# Patient Record
Sex: MALE | URBAN - METROPOLITAN AREA
[De-identification: names, ages, dates, MRNs, and addresses within clinical notes are randomized per-mention and may not be internally consistent; named-entity substitution may affect disease eponyms.]

---

## 2019-04-29 ENCOUNTER — OFFICE VISIT CONVERTED (OUTPATIENT)
Dept: NEUROSURGERY | Facility: CLINIC | Age: 39
End: 2019-04-29
Attending: PHYSICIAN ASSISTANT

## 2019-04-29 ENCOUNTER — CONVERSION ENCOUNTER (OUTPATIENT)
Dept: NEUROLOGY | Facility: CLINIC | Age: 39
End: 2019-04-29

## 2019-12-12 ENCOUNTER — OFFICE VISIT CONVERTED (OUTPATIENT)
Dept: CARDIOLOGY | Facility: CLINIC | Age: 39
End: 2019-12-12
Attending: INTERNAL MEDICINE

## 2019-12-26 ENCOUNTER — CONVERSION ENCOUNTER (OUTPATIENT)
Dept: CARDIOLOGY | Facility: CLINIC | Age: 39
End: 2019-12-26
Attending: INTERNAL MEDICINE

## 2021-04-22 ENCOUNTER — CONVERSION ENCOUNTER (OUTPATIENT)
Dept: CARDIOLOGY | Facility: CLINIC | Age: 41
End: 2021-04-22
Attending: INTERNAL MEDICINE

## 2021-05-11 NOTE — PROCEDURES
"   Procedure Note      Patient Name: Morris Barrios   Patient ID: [id number]   Sex: Male   YOB: 1980    Primary Care Provider: Carmenza CAMERON   Referring Provider: Carmenza CAMERON    Visit Date: April 22, 2021    Provider: Chi Worrell MD   Location: [facility]   Location Address: [address] [place] [address], [address]   [place]  [id number]   Location Phone: [phone]          FINAL REPORT   TRANSTHORACIC ECHOCARDIOGRAM REPORT    Diagnosis: Chest pain   Height: 6'3\" Weight: 271 B/P: 130/88 BSA: 2.5   Tech: [facility]   MEASUREMENTS:  RVID (Diastole) : RVID. (NORMAL: 0.7 to 2.4 cm max)   LVID (Systole): 2.9 cm (Diastole): 4.1 cm. (NORMAL: 3.7 - 5.4 cm)   Posterior Wall Thickness (Diastole): 0.9 cm. (NORMAL: 0.8 - 1.1 cm)   Septal Thickness (Diastole): 1 cm. (NORMAL: 0.7 - 1.2 cm)   LAID (Systole): 3.1 cm. (NORMAL: 1.9 - 3.8 cm)   Aortic Root Diameter (Diastole): 2.8 cm. (NORMAL: 2.0 - 3.7 cm)   DOPPLER: Continuous-wave, pulse-wave, and color-flow examination of the mitral, aortic, and tricuspid valves was performed. No significant stenosis or regurgitation was identified. Doppler flow velocities were normal. E/A ratio is 1.1. DT= 236 msec. IVRT is 77 msec. E/E' is 6.   COMMENTS:  The patient underwent 2-D, M-Mode, and Doppler examination, including pulse-wave, continuous-wave, and color-flow analysis; the study is technically adequate.   FINDINGS:  AORTIC VALVE: Appeared to be normal. Trileaflet with central closure point. No evidence of any obstruction. No regurgitation.   MITRAL VALVE: Appeared to be normal. No evidence of any obstruction. No regurgitation.   TRICUSPID VALVE: Appeared to be normal.   PULMONIC VALVE: Not well seen.   LEFT ATRIUM: Appeared to be normal. No intracavity masses or clots seen. LA volume index is 17 mL/m2.   AORTIC ROOT: Appeared to be normal in size.   LEFT VENTRICLE: Appeared to have an overall normal left ventricular systolic function with a normal EF of 55%. There " is no evidence of any wall motion abnormalities. No cavitary dilatation. Normal wall thickness.   RIGHT ATRIUM: Appeared to be normal; no obvious evidence of intracavity masses or clots.   RIGHT VENTRICLE: Normal size and function.   PERICARDIUM: Unremarkable. No evidence of effusion.   INFERIOR VENA CAVA: Diameter is 1.2 cm.   Fax: 04/27/2021      IMPRESSION:    1.  Normal ejection fraction of 55%.  2.  No significant valvular heart issues.                 Electronically Signed by: Ela Richmond-, Other -Author on April 27, 2021 11:52:13 AM  Electronically Co-signed by: Chi Worrell MD -Reviewer on April 28, 2021 12:56:25 PM

## 2021-05-14 VITALS
WEIGHT: 271 LBS | SYSTOLIC BLOOD PRESSURE: 130 MMHG | DIASTOLIC BLOOD PRESSURE: 88 MMHG | HEIGHT: 75 IN | HEART RATE: 98 BPM | BODY MASS INDEX: 33.69 KG/M2

## 2021-05-14 NOTE — PROGRESS NOTES
"   Progress Note      Patient Name: Morris Barrios   Patient ID: [id number]   Sex: Male   YOB: 1980    Primary Care Provider: Carmenza CAMERON   Referring Provider: Carmenza CAMERON    Visit Date: April 22, 2021    Provider: Chi Worrell MD   Location: [facility]   Location Address: [address] [place] [address], [address]   [place], [place]  [id number]   Location Phone: [phone]          Chief Complaint     Hypertension.       History Of Present Illness  REFERRING CARE PROVIDER: Carmenza CAMERON   Morris Barrios is a 40 year old male with a history of hypertension, essential in nature, but difficult to control, who has had COVID in November 2020. Since then, he has had some mild shortness of breath, as well as intermittent heartburn symptoms, substernal, once every couple weeks or so. He does not report any ongoing exertional chest pain problems.   PAST MEDICAL HISTORY: Arthritis; Bipolar affective disorder; Elevated LFTs; Gout; Hypertension; Reflux disease.   PSYCHOSOCIAL HISTORY: Current tobacco use. Daily alcohol consumption.   CURRENT MEDICATIONS: Medications reviewed and are as documented.      ALLERGIES: No known drug allergies.       Review of Systems  · Cardiovascular  o Denies  o : palpitations (fast, fluttering, or skipping beats), swelling (feet, ankles, hands), shortness of breath while walking or lying flat, chest pain or angina pectoris   · Respiratory  o Denies  o : chronic or frequent cough      Vitals  Date Time BP Position Site L\R Cuff Size HR RR TEMP (F) WT  HT  BMI kg/m2 BSA m2 O2 Sat FR L/min FiO2 HC       04/22/2021 09:19 /88 Sitting    98 - R   271lbs 0oz 6'  3\" 33.87 2.55             Physical Examination  · Constitutional  o Appearance  o : Awake, alert, in no acute distress.   · Eyes  o Conjunctivae  o : Normal.  · Ears, Nose, Mouth and Throat  o Oral Cavity  o :   § Oral Mucosa  § : Normal.  · Neck  o Inspection/Palpation  o : No JVD. Good carotid upstroke. No " thyromegaly.  · Respiratory  o Respiratory  o : Good respiratory effort. Clear to percussion and auscultation.  · Cardiovascular  o Heart  o :   § Auscultation of Heart  § : S1, S2 normal. Regular rate and rhythm without murmurs, gallops, or rubs.  o Peripheral Vascular System  o :   § Extremities  § : Good femoral and pedal pulses. No pedal edema.  · Gastrointestinal  o Abdominal Examination  o : Soft. No tenderness or masses felt. No hepatosplenomegaly. Abdominal aorta is not palpable.          Assessment     ASSESSMENT & PLAN:    1.  Hypertension, currently controlled.  Continue with his current antihypertensive regimen.  Continued to         on exercise and weight loss.    2.  Chest pain, atypical, sounds more GI related.  Recommended minimizing meloxicam use.  No significant        abnormality seen on echocardiogram today.             Electronically Signed by: Ela Richmond-, Other -Author on April 27, 2021 11:39:56 AM  Electronically Co-signed by: Chi Worrell MD -Reviewer on April 28, 2021 12:51:44 PM

## 2021-05-15 VITALS
HEART RATE: 84 BPM | DIASTOLIC BLOOD PRESSURE: 110 MMHG | WEIGHT: 265 LBS | SYSTOLIC BLOOD PRESSURE: 180 MMHG | BODY MASS INDEX: 32.95 KG/M2 | HEIGHT: 75 IN

## 2021-05-15 VITALS — WEIGHT: 286 LBS | HEIGHT: 75 IN | BODY MASS INDEX: 35.56 KG/M2 | HEART RATE: 77 BPM

## 2021-05-17 ENCOUNTER — HOSPITAL ENCOUNTER (OUTPATIENT)
Dept: NUCLEAR MEDICINE | Facility: HOSPITAL | Age: 41
Discharge: HOME | End: 2021-05-17
Attending: INTERNAL MEDICINE

## 2024-04-01 ENCOUNTER — PREP FOR SURGERY (OUTPATIENT)
Dept: OTHER | Facility: HOSPITAL | Age: 44
End: 2024-04-01
Payer: COMMERCIAL

## 2024-04-01 ENCOUNTER — CLINICAL SUPPORT (OUTPATIENT)
Dept: GASTROENTEROLOGY | Facility: CLINIC | Age: 44
End: 2024-04-01
Payer: COMMERCIAL

## 2024-04-01 DIAGNOSIS — Z80.0 FAMILY HISTORY OF COLON CANCER: ICD-10-CM

## 2024-04-01 DIAGNOSIS — R19.5 POSITIVE COLORECTAL CANCER SCREENING USING COLOGUARD TEST: Primary | ICD-10-CM

## 2024-04-01 RX ORDER — POLYETHYLENE GLYCOL 3350, SODIUM SULFATE, SODIUM CHLORIDE, POTASSIUM CHLORIDE, ASCORBIC ACID, SODIUM ASCORBATE 140-9-5.2G
1 KIT ORAL TAKE AS DIRECTED
Qty: 1 EACH | Refills: 0 | Status: SHIPPED | OUTPATIENT
Start: 2024-04-01

## 2024-04-01 RX ORDER — COLCHICINE 0.6 MG/1
TABLET ORAL EVERY 12 HOURS SCHEDULED
COMMUNITY

## 2024-04-01 RX ORDER — CLOBETASOL PROPIONATE 0.5 MG/G
OINTMENT TOPICAL SEE ADMIN INSTRUCTIONS
COMMUNITY
Start: 2024-01-29

## 2024-04-01 RX ORDER — DOXEPIN 6 MG/1
1 TABLET, FILM COATED ORAL
COMMUNITY

## 2024-04-01 RX ORDER — AMLODIPINE BESYLATE VALSARTAN HYDROCHLOROTHIAZIDE 10; 25; 320 MG/1; MG/1; MG/1
1 TABLET, FILM COATED ORAL DAILY
COMMUNITY
Start: 2024-01-30

## 2024-04-01 RX ORDER — ALLOPURINOL 300 MG/1
TABLET ORAL
COMMUNITY

## 2024-04-01 NOTE — PROGRESS NOTES
Morris Barrios  1980  43 y.o.    Reason for call: Screening Colonoscopy and Positive Cologuard    Prep prescribed: Plenvu  Prep instructions reviewed with patient and sent to patient via [facility]    Clearance needed? NONE    Is the patient currently on any injectable medications for weight loss or diabetes? No      Family history of colon cancer? Yes  If yes, indicate relative: Mat Grandmother    The patient has been scheduled for: Colonoscopy  Procedure Date: 4.18.24  Family History   Problem Relation Age of Onset    Colon cancer Maternal Grandmother      History reviewed. No pertinent past medical history.  No Known Allergies  History reviewed. No pertinent surgical history.  Social History     Socioeconomic History    Marital status:    Tobacco Use    Smoking status: Never    Smokeless tobacco: Never   Vaping Use    Vaping status: Never Used   Substance and Sexual Activity    Alcohol use: Yes    Drug use: Defer    Sexual activity: Defer       Current Outpatient Medications:     allopurinol (ZYLOPRIM) 300 MG tablet, 1 orally 1 time daily for 90 days, Disp: , Rfl:     amLODIPine-Valsartan-HCTZ -25 MG tablet, Take 1 tablet by mouth Daily., Disp: , Rfl:     clobetasol (TEMOVATE) 0.05 % ointment, Apply  topically to the appropriate area as directed See Admin Instructions. Apply topically to the affected area twice daily, Disp: , Rfl:     colchicine 0.6 MG tablet, Every 12 (Twelve) Hours., Disp: , Rfl:     Doxepin HCl 6 MG tablet, Take 1 tablet by mouth every night at bedtime., Disp: , Rfl:

## 2024-04-02 PROBLEM — Z80.0 FAMILY HISTORY OF COLON CANCER: Status: ACTIVE | Noted: 2024-04-01

## 2024-04-02 PROBLEM — R19.5 POSITIVE COLORECTAL CANCER SCREENING USING COLOGUARD TEST: Status: ACTIVE | Noted: 2024-04-01

## 2024-04-09 NOTE — PRE-PROCEDURE INSTRUCTIONS
"Instructed on date and arrival time of 0730. Come to entrance \"C\". Must have  over age 18 to drive home.  May have two visitors; however, children under 12 must stay in waiting room.  Discussed clear liquid diet (no red or purple), bowel prep, and NPO.  May take medications as usual except for blood thinners, diabetic medications, and weight loss medications.  Verbalized understanding of instructions given.  Instructed to call for questions or concerns.  "

## 2024-04-16 ENCOUNTER — ANESTHESIA EVENT (OUTPATIENT)
Dept: GASTROENTEROLOGY | Facility: HOSPITAL | Age: 44
End: 2024-04-16
Payer: COMMERCIAL

## 2024-04-16 NOTE — ANESTHESIA PREPROCEDURE EVALUATION
Anesthesia Evaluation     Patient summary reviewed and Nursing notes reviewed                Airway   Dental      Pulmonary - negative pulmonary ROS   Cardiovascular     (+) hypertension well controlled less than 2 medications      Neuro/Psych- negative ROS  GI/Hepatic/Renal/Endo - negative ROS     Musculoskeletal (-) negative ROS    Abdominal    Substance History - negative use     OB/GYN negative ob/gyn ROS         Other - negative ROS                   Anesthesia Plan    ASA 1     general   total IV anesthesia  (Total IV Anesthesia    Patient understands anesthesia not responsible for dental damage.  )  intravenous induction     Anesthetic plan, risks, benefits, and alternatives have been provided, discussed and informed consent has been obtained with: patient.    Plan discussed with CRNA.    CODE STATUS:

## 2024-04-18 ENCOUNTER — ANESTHESIA (OUTPATIENT)
Dept: GASTROENTEROLOGY | Facility: HOSPITAL | Age: 44
End: 2024-04-18
Payer: COMMERCIAL

## 2024-04-18 ENCOUNTER — HOSPITAL ENCOUNTER (OUTPATIENT)
Facility: HOSPITAL | Age: 44
Setting detail: HOSPITAL OUTPATIENT SURGERY
Discharge: HOME | End: 2024-04-18
Attending: INTERNAL MEDICINE | Admitting: INTERNAL MEDICINE
Payer: COMMERCIAL

## 2024-04-18 VITALS
HEART RATE: 92 BPM | BODY MASS INDEX: 33.72 KG/M2 | WEIGHT: 271.17 LBS | SYSTOLIC BLOOD PRESSURE: 121 MMHG | HEIGHT: 75 IN | OXYGEN SATURATION: 93 % | RESPIRATION RATE: 16 BRPM | TEMPERATURE: 97 F | DIASTOLIC BLOOD PRESSURE: 82 MMHG

## 2024-04-18 DIAGNOSIS — R19.5 POSITIVE COLORECTAL CANCER SCREENING USING COLOGUARD TEST: ICD-10-CM

## 2024-04-18 DIAGNOSIS — Z80.0 FAMILY HISTORY OF COLON CANCER: ICD-10-CM

## 2024-04-18 PROCEDURE — 45380 COLONOSCOPY AND BIOPSY: CPT | Performed by: INTERNAL MEDICINE

## 2024-04-18 PROCEDURE — 25010000002 PROPOFOL 10 MG/ML EMULSION: Performed by: NURSE ANESTHETIST, CERTIFIED REGISTERED

## 2024-04-18 PROCEDURE — 25810000003 LACTATED RINGERS PER 1000 ML: Performed by: NURSE ANESTHETIST, CERTIFIED REGISTERED

## 2024-04-18 PROCEDURE — 88305 TISSUE EXAM BY PATHOLOGIST: CPT | Performed by: INTERNAL MEDICINE

## 2024-04-18 DEVICE — DEV CLIP ENDO RESOLUTION360 CONTRL ROT 235CM: Type: IMPLANTABLE DEVICE | Site: DESCENDING COLON | Status: FUNCTIONAL

## 2024-04-18 RX ORDER — SODIUM CHLORIDE, SODIUM LACTATE, POTASSIUM CHLORIDE, CALCIUM CHLORIDE 600; 310; 30; 20 MG/100ML; MG/100ML; MG/100ML; MG/100ML
30 INJECTION, SOLUTION INTRAVENOUS CONTINUOUS
Status: DISCONTINUED | OUTPATIENT
Start: 2024-04-18 | End: 2024-04-18 | Stop reason: HOSPADM

## 2024-04-18 RX ORDER — PROPOFOL 10 MG/ML
VIAL (ML) INTRAVENOUS AS NEEDED
Status: DISCONTINUED | OUTPATIENT
Start: 2024-04-18 | End: 2024-04-18

## 2024-04-18 RX ORDER — SODIUM CHLORIDE, SODIUM LACTATE, POTASSIUM CHLORIDE, CALCIUM CHLORIDE 600; 310; 30; 20 MG/100ML; MG/100ML; MG/100ML; MG/100ML
INJECTION, SOLUTION INTRAVENOUS CONTINUOUS PRN
Status: DISCONTINUED | OUTPATIENT
Start: 2024-04-18 | End: 2024-04-18 | Stop reason: SURG

## 2024-04-18 RX ORDER — PROPOFOL 10 MG/ML
VIAL (ML) INTRAVENOUS CONTINUOUS PRN
Status: DISCONTINUED | OUTPATIENT
Start: 2024-04-18 | End: 2024-04-18 | Stop reason: SURG

## 2024-04-18 RX ORDER — MELOXICAM 15 MG/1
15 TABLET ORAL DAILY
COMMUNITY

## 2024-04-18 RX ORDER — ERGOCALCIFEROL 1.25 MG/1
50000 CAPSULE ORAL WEEKLY
COMMUNITY

## 2024-04-18 RX ORDER — LIDOCAINE HYDROCHLORIDE 20 MG/ML
INJECTION, SOLUTION EPIDURAL; INFILTRATION; INTRACAUDAL; PERINEURAL AS NEEDED
Status: DISCONTINUED | OUTPATIENT
Start: 2024-04-18 | End: 2024-04-18 | Stop reason: SURG

## 2024-04-18 RX ADMIN — SODIUM CHLORIDE, POTASSIUM CHLORIDE, SODIUM LACTATE AND CALCIUM CHLORIDE: 600; 310; 30; 20 INJECTION, SOLUTION INTRAVENOUS at 10:06

## 2024-04-18 RX ADMIN — PROPOFOL 225 MCG/KG/MIN: 10 INJECTION, EMULSION INTRAVENOUS at 10:06

## 2024-04-18 RX ADMIN — LIDOCAINE HYDROCHLORIDE 100 MG: 20 INJECTION, SOLUTION EPIDURAL; INFILTRATION; INTRACAUDAL; PERINEURAL at 10:06

## 2024-04-18 NOTE — ANESTHESIA POSTPROCEDURE EVALUATION
Patient: Morris Barrios    Procedure Summary       Date: 04/18/24 Room / Location: [facility] / [facility]    Anesthesia Start: 1003 Anesthesia Stop: 1047    Procedure: COLONOSCOPY WITH POLYP BIOPSY, CLIP APPLICATION X1 Diagnosis:       Positive colorectal cancer screening using Cologuard test      Family history of colon cancer      (Positive colorectal cancer screening using Cologuard test [R19.5])      (Family history of colon cancer [Z80.0])    Surgeons: Jose Guadalupe Barrera MD Provider: Hernandez Nathan CRNA    Anesthesia Type: general ASA Status: 1            Anesthesia Type: general    Vitals  Vitals Value Taken Time   /82 04/18/24 1100   Temp 36.1 °C (97 °F) 04/18/24 1100   Pulse 92 04/18/24 1100   Resp 16 04/18/24 1100   SpO2 93 % 04/18/24 1100           Post Anesthesia Care and Evaluation    Post-procedure mental status: acceptable.  Pain management: satisfactory to patient    Airway patency: patent  Anesthetic complications: No anesthetic complications    Cardiovascular status: acceptable  Respiratory status: acceptable    Comments: Per chart review

## 2024-04-18 NOTE — H&P
Pre Procedure History & Physical    Chief Complaint: Colonoscopy    HPI: Patient is 43 years old male with known history of hypertension, gout rhabdomyolysis presented today for open access diagnostic screening colonoscopy given finding of positive Cologuard test.  Patient never had colonoscopy before.  Both of his grandparents from mother that aside had a history of colon cancer.  No other family members with colon cancer or colon polyps.      Patient has history of hemorrhoids that intermittently bleed.  No altered bowel habits including constipation or diarrhea.  No melena, hematochezia.  No abdominal pain.  No nausea or vomiting or poor appetite or unintentional significant weight loss.    Past Medical History:   Past Medical History:   Diagnosis Date    Gout     Hypertension     Rhabdomyolysis        Past Surgical History:  Past Surgical History:   Procedure Laterality Date    WISDOM TOOTH EXTRACTION         Family History:  Family History   Problem Relation Age of Onset    Colon cancer Maternal Grandmother        Social History:   reports that he has never smoked. He has never used smokeless tobacco. He reports current alcohol use. Drug use questions deferred to the physician.    Medications:   Medications Prior to Admission   Medication Sig Dispense Refill Last Dose    allopurinol (ZYLOPRIM) 300 MG tablet 1 orally 1 time daily for 90 days       amLODIPine-Valsartan-HCTZ -25 MG tablet Take 1 tablet by mouth Daily.       clobetasol (TEMOVATE) 0.05 % ointment Apply  topically to the appropriate area as directed See Admin Instructions. Apply topically to the affected area twice daily       colchicine 0.6 MG tablet Every 12 (Twelve) Hours.       Doxepin HCl 6 MG tablet Take 1 tablet by mouth every night at bedtime.          Allergies:  Patient has no known allergies.      Objective     Blood pressure 142/82, pulse 93, temperature 98.5 °F (36.9 °C), temperature source Temporal, resp. rate 20, height 190.5 cm  "(75\"), weight 123 kg (271 lb 2.7 oz), SpO2 94%.    Physical Exam   Constitutional: Pt is oriented to person, place, and time and well-developed, well-nourished, and in no distress.   Mouth/Throat: Oropharynx is clear and moist.   Neck: Normal range of motion.   Cardiovascular: Normal rate, regular rhythm and normal heart sounds.    Pulmonary/Chest: Effort normal and breath sounds normal.   Abdominal: Soft. Nontender  Skin: Skin is warm and dry.   Psychiatric: Mood, memory, affect and judgment normal.     Assessment & Plan     Diagnosis:    Positive Cologuard test    Anticipated Surgical Procedure:    Colonoscopy    The risks, benefits, and alternatives of this procedure have been discussed with the patient or the responsible party- the patient understands and agrees to proceed.        Electronically signed by Jose Guadalupe Barrera MD, 04/18/24, 9:24 AM EDT.            "

## 2024-04-22 LAB
CYTO UR: NORMAL
LAB AP CASE REPORT: NORMAL
LAB AP CLINICAL INFORMATION: NORMAL
PATH REPORT.FINAL DX SPEC: NORMAL
PATH REPORT.GROSS SPEC: NORMAL

## (undated) DEVICE — SOL IRRG H2O PL/BG 1000ML STRL

## (undated) DEVICE — SINGLE-USE BIOPSY FORCEPS: Brand: RADIAL JAW 4

## (undated) DEVICE — Device: Brand: DEFENDO AIR/WATER/SUCTION AND BIOPSY VALVE

## (undated) DEVICE — Device

## (undated) DEVICE — SOLIDIFIER LIQLOC PLS 1500CC BT